# Patient Record
Sex: MALE | Race: WHITE | NOT HISPANIC OR LATINO | URBAN - METROPOLITAN AREA
[De-identification: names, ages, dates, MRNs, and addresses within clinical notes are randomized per-mention and may not be internally consistent; named-entity substitution may affect disease eponyms.]

---

## 2024-04-12 ENCOUNTER — THERAPY VISIT (OUTPATIENT)
Dept: PHYSICAL THERAPY | Facility: CLINIC | Age: 24
End: 2024-04-12
Payer: COMMERCIAL

## 2024-04-12 DIAGNOSIS — M22.2X2 PATELLOFEMORAL PAIN SYNDROME OF LEFT KNEE: Primary | ICD-10-CM

## 2024-04-12 PROCEDURE — 97161 PT EVAL LOW COMPLEX 20 MIN: CPT | Mod: GP

## 2024-04-12 PROCEDURE — 97110 THERAPEUTIC EXERCISES: CPT | Mod: GP

## 2024-04-12 ASSESSMENT — ACTIVITIES OF DAILY LIVING (ADL)
HOW_WOULD_YOU_RATE_THE_OVERALL_FUNCTION_OF_YOUR_KNEE_DURING_YOUR_USUAL_DAILY_ACTIVITIES?: NORMAL
KNEEL ON THE FRONT OF YOUR KNEE: ACTIVITY IS NOT DIFFICULT
WEAKNESS: I DO NOT HAVE THE SYMPTOM
GO UP STAIRS: ACTIVITY IS MINIMALLY DIFFICULT
PAIN: I HAVE THE SYMPTOM BUT IT DOES NOT AFFECT MY ACTIVITY
STIFFNESS: I DO NOT HAVE THE SYMPTOM
RISE FROM A CHAIR: ACTIVITY IS NOT DIFFICULT
GO DOWN STAIRS: ACTIVITY IS NOT DIFFICULT
RAW_SCORE: 68
SWELLING: I DO NOT HAVE THE SYMPTOM
KNEE_ACTIVITY_OF_DAILY_LIVING_SCORE: 97.14
SIT WITH YOUR KNEE BENT: ACTIVITY IS NOT DIFFICULT
SQUAT: ACTIVITY IS NOT DIFFICULT
KNEE_ACTIVITY_OF_DAILY_LIVING_SUM: 68
HOW_WOULD_YOU_RATE_THE_CURRENT_FUNCTION_OF_YOUR_KNEE_DURING_YOUR_USUAL_DAILY_ACTIVITIES_ON_A_SCALE_FROM_0_TO_100_WITH_100_BEING_YOUR_LEVEL_OF_KNEE_FUNCTION_PRIOR_TO_YOUR_INJURY_AND_0_BEING_THE_INABILITY_TO_PERFORM_ANY_OF_YOUR_USUAL_DAILY_ACTIVITIES?: 90
LIMPING: I DO NOT HAVE THE SYMPTOM
STAND: ACTIVITY IS NOT DIFFICULT
WALK: ACTIVITY IS NOT DIFFICULT
AS_A_RESULT_OF_YOUR_KNEE_INJURY,_HOW_WOULD_YOU_RATE_YOUR_CURRENT_LEVEL_OF_DAILY_ACTIVITY?: NEARLY NORMAL
GIVING WAY, BUCKLING OR SHIFTING OF KNEE: I DO NOT HAVE THE SYMPTOM

## 2024-04-12 NOTE — PROGRESS NOTES
PHYSICAL THERAPY EVALUATION  Type of Visit: Evaluation    See electronic medical record for Abuse and Falls Screening details.    Subjective       Presenting condition or subjective complaint:    Patient presents with L knee pain. Went to [facility], diagnosed with PFPS. Started around February of this year. Most painful with sitting for extended times. For exercise does running and weight lifting. Hasn't been running because he's not sure if it will make his knee worse. He has also stopped doing most squats for the same reason. He was prescribed an anti-inflammatory, has taken it a couple days, not sure if it's helping at all. Normal running routine was 4-7 miles about 3x/week.   Date of onset:      Relevant medical history: Asthma   Dates & types of surgery:      Prior diagnostic imaging/testing results: X-ray     Prior therapy history for the same diagnosis, illness or injury: No      Prior Level of Function  Transfers: Independent  Ambulation: Independent  ADL: Independent  IADL:  Independent    Living Environment  Social support: Alone   Type of home:     Stairs to enter the home:         Ramp:     Stairs inside the home:         Help at home: None  Equipment owned:       Employment: Yes    Hobbies/Interests: running, hiking, weight lifting    Patient goals for therapy: heavy weight lifting, long distance running    Pain assessment: See objective evaluation for additional pain details     Objective   KEY FINDINGS:  Poor squat/SL squat form  Good LE strength  Hamstring and quad tightness bilateral, L>R CKC ankle DF limitation    KNEE EVALUATION  PAIN: Pain Level at Rest: 0/10  Pain Level with Use: 2/10  Pain Location: anterior knee, under knee cap  Pain Quality: Pressure  Other symptoms: denies numbness/tingling  Pain Frequency: intermittent  Time dependent: no  Pain is Exacerbated By: sitting more than 20-30 minutes  Pain is Relieved By: walking, elevation, sitting with leg extended/propped up  Pain Progression:  Unchanged  INTEGUMENTARY (edema, incisions):   POSTURE:   GAIT:  Weightbearing Status:   Assistive Device(s):   Gait Deviations:   R foot ER>L  BALANCE/PROPRIOCEPTION:   WEIGHTBEARING ALIGNMENT:   Mildly flat feet RIOS  NON-WEIGHTBEARING ALIGNMENT:   ROM:   Lower Extremity ROM   Left (PROM) Left (AROM) Right (PROM) Right (AROM)   Hip Flex WNL  WNL    Hip Ext       Hip ER WNL  WNL    Hip IR WNL  WNL    Hip ABD       Hip ADD       Knee Ext WNL, no pain OP  WNL    Knee Flex WNL, no pain OP  WNL    CKC ankle DF knee to wall: L 7 cm, R 9.5 cm    STRENGTH:   Lower Extremity Muscle Strength   Left Right   Hip Flex 5/5 5/5   Hip Ext 5/5 5/5   Hip ER 5/5 5/5   Hip IR 5/5 5/5   Hip ABD 5/5 5/5   Hip ADD /5 /5   Knee Ext 5/5 5/5   Knee Flex 5/5 5/5   Quad Set     *indicates pain    FLEXIBILITY:   Hamstring and quad tightness RIOS  SPECIAL TESTS:   Negative varus/valgus stress at 0 and 30 deg  Negative Lachmans  Negative McMurrays  FUNCTIONAL TESTS: Double Leg Squat: Anterior knee translation, Knee valgus, Hip internal rotation, and Improper use of glutes/hips  Single Leg Squat: Anterior knee translation, Knee valgus, Hip internal rotation, and Improper use of glutes/hips  PALPATION: WNL  JOINT MOBILITY: WNL, normal patellar tracking      Assessment & Plan   CLINICAL IMPRESSIONS  Medical Diagnosis: patellofemoral pain    Treatment Diagnosis: L knee pain   Impression/Assessment: Patient is a 23 year old male with L knee complaints.  The following significant findings have been identified: Pain, Decreased ROM/flexibility, Decreased strength, Impaired balance, Decreased proprioception, and Decreased activity tolerance. These impairments interfere with their ability to perform self care tasks, recreational activities, household chores, and community mobility as compared to previous level of function.     Clinical Decision Making (Complexity):  Clinical Presentation: Stable/Uncomplicated  Clinical Presentation Rationale: based on  medical and personal factors listed in PT evaluation  Clinical Decision Making (Complexity): Low complexity    PLAN OF CARE  Treatment Interventions:  Modalities: Dry Needling  Interventions: Gait Training, Manual Therapy, Neuromuscular Re-education, Therapeutic Activity, Therapeutic Exercise, Self-Care/Home Management    Long Term Goals     PT Goal 1  Goal Identifier: Running  Goal Description: patient will be able to run 7 mile without increased knee pain in order to maintain healthy and active lifestyle  Target Date: 06/07/24  PT Goal 2  Goal Identifier: Sitting  Goal Description: patient will be able to sit for 1 hr without increased pain  Rationale: to maximize safety and independence with performance of ADLs and functional tasks;to maximize safety and independence within the home;to maximize safety and independence within the community;to maximize safety and independence with transportation  Target Date: 06/07/24      Frequency of Treatment: 1x/week  Duration of Treatment: 8 weeks    Recommended Referrals to Other Professionals:  none  Education Assessment:   Learner/Method: Patient;Demonstration;Pictures/Video;No Barriers to Learning    Risks and benefits of evaluation/treatment have been explained.   Patient/Family/caregiver agrees with Plan of Care.     Evaluation Time:     PT Eval, Low Complexity Minutes (92438): 25       Signing Clinician: Carina Perez PT

## 2024-04-15 ENCOUNTER — TRANSCRIBE ORDERS (OUTPATIENT)
Dept: GENERAL RADIOLOGY | Facility: CLINIC | Age: 24
End: 2024-04-15
Payer: COMMERCIAL

## 2024-05-03 ENCOUNTER — THERAPY VISIT (OUTPATIENT)
Dept: PHYSICAL THERAPY | Facility: CLINIC | Age: 24
End: 2024-05-03
Payer: COMMERCIAL

## 2024-05-03 DIAGNOSIS — M22.2X2 PATELLOFEMORAL PAIN SYNDROME OF LEFT KNEE: Primary | ICD-10-CM

## 2024-05-03 PROCEDURE — 97110 THERAPEUTIC EXERCISES: CPT | Mod: GP

## 2024-06-25 ENCOUNTER — THERAPY VISIT (OUTPATIENT)
Dept: PHYSICAL THERAPY | Facility: CLINIC | Age: 24
End: 2024-06-25
Payer: COMMERCIAL

## 2024-06-25 DIAGNOSIS — M22.2X2 PATELLOFEMORAL PAIN SYNDROME OF LEFT KNEE: Primary | ICD-10-CM

## 2024-06-25 PROCEDURE — 97112 NEUROMUSCULAR REEDUCATION: CPT | Mod: GP

## 2024-06-25 PROCEDURE — 97110 THERAPEUTIC EXERCISES: CPT | Mod: GP

## 2024-06-26 NOTE — PROGRESS NOTES
06/25/24 0500   Appointment Info   Signing clinician's name / credentials Carian Perez DPT/Kwame Rowan, SPT   Total/Authorized Visits E&T 8 POC   Visits Used 3   Medical Diagnosis patellofemoral pain   PT Tx Diagnosis L knee pain   Progress Note/Certification   Therapy Frequency 1x/week   Predicted Duration 8 weeks   Progress Note Due Date 06/11/24   Progress Note Completed Date 04/12/24   GOALS   PT Goals 2   PT Goal 1   Goal Identifier Running   Goal Description patient will be able to run 7 mile without increased knee pain in order to maintain healthy and active lifestyle   Goal Progress 5 miles w/ min soreness   Target Date 07/24/24   PT Goal 2   Goal Identifier Sitting   Goal Description patient will be able to sit for 1 hr without increased pain   Rationale to maximize safety and independence with performance of ADLs and functional tasks;to maximize safety and independence within the home;to maximize safety and independence within the community;to maximize safety and independence with transportation   Goal Progress continues to improve, but depends on chair/knee position. date extended   Target Date 07/24/24   Subjective Report   Subjective Report feels like he is improving slowly. Runs 5 miles with minimal discomfort, minimal walking breaks. Sitting for a long time is still uncomfortbale. When he moves his knee the pain gets better.   Objective Measures   Objective Measures Objective Measure 1   Objective Measure 1   Objective Measure squat/SL squat   Details Improved squat mechanics with better hip/knee control. Cued for less depth as pt compensating with over pronating.   Treatment Interventions (PT)   Interventions Therapeutic Procedure/Exercise;Neuromuscular Re-education   Therapeutic Procedure/Exercise   Therapeutic Procedures: strength, endurance, ROM, flexibility minutes (28019) 30   PTRx Ther Proc 1 Squat   PTRx Ther Proc 1 - Details 10x barbell, no plates for MELGOZA, 10x w/ 10# plates, 8x w/  "total 115#   PTRx Ther Proc 2 Functional Lunge Backward   PTRx Ther Proc 2 - Details static lunge 2x10 B, 15# dumbbells, second set cued for less anterior knee excursion, keeping heel on the ground   PTRx Ther Proc 3 Standing Hamstring Stretch   PTRx Ther Proc 3 - Details VR continue HEP for flexibility   PTRx Ther Proc 4 Standing Quadricep Stretch   PTRx Ther Proc 4 - Details VR continue HEP for flexibility   PTRx Ther Proc 5 Sidestep   PTRx Ther Proc 5 - Details VR sidestep, introduced monster walks   PTRx Ther Proc 6 Clamshell with Theraband   PTRx Ther Proc 6 - Details VR continue HEP for strengthening   Skilled Intervention Education on purpose/benefit of HEP based on patient history and exam findings. Patient should not push into pain with exercises, education on how to adjust program based on how it feels and goals of strengthening/ROM for increased function and pain relief. Cues for proper positioning.   PTRx Ther Proc 7 Hamstring Reach   PTRx Ther Proc 7 - Details 2x10 B, cues for positioning, edu on how to progress, a couple trials B w/ 15#   PTRx Ther Proc 8 Monster Walks   PTRx Ther Proc 8 - Details 2x20' green TB fwd/bkwd   Neuromuscular Re-education   Neuromuscular re-ed of mvmt, balance, coord, kinesthetic sense, posture, proprioception minutes (93383) 10   Neuro Re-ed 1 blaze pods   Neuro Re-ed 1 - Details \"Random\" foot taps in mini squat, fwd and side to side shuffles. Hand taps with multidirectional lunges, cues for knee position and soft landings. Several rounds   Skilled Intervention appropriate progressions   Patient Response/Progress tolerated well, no knee pain   Education   Learner/Method Patient;Demonstration;Pictures/Video;No Barriers to Learning   Plan   Home program online and printed   Updates to plan of care added monster walks and hamstring reach   Plan for next session discharge   Total Session Time   Timed Code Treatment Minutes 40   Total Treatment Time (sum of timed and untimed " services) 40         PLAN  Patient is progressing well, though has not fully met his goals. He will continue to work on HEP independently and return to PT as needed for further intervention for pain management, progression or adjustment of POC.    Beginning/End Dates of Progress Note Reporting Period:  04/12/24 to 06/25/2024    Referring Provider:  Yuan Terrell